# Patient Record
Sex: MALE | Race: WHITE | URBAN - METROPOLITAN AREA
[De-identification: names, ages, dates, MRNs, and addresses within clinical notes are randomized per-mention and may not be internally consistent; named-entity substitution may affect disease eponyms.]

---

## 2017-08-14 ENCOUNTER — IMPORTED ENCOUNTER (OUTPATIENT)
Dept: URBAN - METROPOLITAN AREA CLINIC 50 | Facility: CLINIC | Age: 82
End: 2017-08-14

## 2017-10-30 ENCOUNTER — IMPORTED ENCOUNTER (OUTPATIENT)
Dept: URBAN - METROPOLITAN AREA CLINIC 50 | Facility: CLINIC | Age: 82
End: 2017-10-30

## 2018-01-29 ENCOUNTER — IMPORTED ENCOUNTER (OUTPATIENT)
Dept: URBAN - METROPOLITAN AREA CLINIC 50 | Facility: CLINIC | Age: 83
End: 2018-01-29

## 2019-01-28 ENCOUNTER — IMPORTED ENCOUNTER (OUTPATIENT)
Dept: URBAN - METROPOLITAN AREA CLINIC 50 | Facility: CLINIC | Age: 84
End: 2019-01-28

## 2019-01-28 NOTE — PATIENT DISCUSSION
"""Stable; continue artificial tears. "" ""Continue Artificial tears both eyes two - four times a day

## 2019-05-31 ENCOUNTER — IMPORTED ENCOUNTER (OUTPATIENT)
Dept: URBAN - METROPOLITAN AREA CLINIC 50 | Facility: CLINIC | Age: 84
End: 2019-05-31

## 2019-06-03 ENCOUNTER — IMPORTED ENCOUNTER (OUTPATIENT)
Dept: URBAN - METROPOLITAN AREA CLINIC 50 | Facility: CLINIC | Age: 84
End: 2019-06-03

## 2021-04-18 ASSESSMENT — VISUAL ACUITY
OS_CC: J1
OD_CC: J1
OS_CC: 20/30
OD_BAT: >20/400
OS_CC: 20/20
OD_OTHER: >20/400.
OS_BAT: 20/50-
OS_CC: 20/30
OD_CC: J1+
OS_CC: J1+
OD_CC: 20/30-1
OS_CC: 20/30-2
OS_CC: J1+
OD_CC: 20/40
OD_CC: 20/25
OS_OTHER: 20/50-. 20/60-.
OD_CC: J1+
OD_CC: 20/400
OD_PH: 20/30-

## 2021-04-18 ASSESSMENT — TONOMETRY
OD_IOP_MMHG: 7
OS_IOP_MMHG: 8
OD_IOP_MMHG: 5
OD_IOP_MMHG: 8
OD_IOP_MMHG: 6
OS_IOP_MMHG: 9
OS_IOP_MMHG: 8
OS_IOP_MMHG: 6

## 2021-10-04 NOTE — PATIENT DISCUSSION
Patient is S/P YAG OU, intermittent dry eye likely due to dry eye. Will refer patient to Dr. Sydney Murdock for dry eye evaluation and treatment.

## 2021-10-04 NOTE — PATIENT DISCUSSION
The patient has dry eye syndrome or keratoconjunctivitis sicca. The tear glands of the eye are producing a low volume or low quality of tear. Resulting symptoms include burning, itching, redness, foreign body sensation, dryness, mucus discharge, and reflex tearing (from the lacrimal gland). In most cases, treatment is directed towards controlling the symptoms. Treatment may include ocular lubricants (tears, gels and ointments), punctual occlusion, nutritional supplements, topical medications and control of lid margin disease. Diagnosis, treatment and follow-up recommendations have been discussed with the patient.